# Patient Record
Sex: MALE | Race: WHITE
[De-identification: names, ages, dates, MRNs, and addresses within clinical notes are randomized per-mention and may not be internally consistent; named-entity substitution may affect disease eponyms.]

---

## 2019-11-07 ENCOUNTER — HOSPITAL ENCOUNTER (OUTPATIENT)
Dept: HOSPITAL 56 - MW.ED | Age: 39
LOS: 1 days | Discharge: HOME | End: 2019-11-08
Attending: SURGERY
Payer: COMMERCIAL

## 2019-11-07 DIAGNOSIS — K35.80: Primary | ICD-10-CM

## 2019-11-07 DIAGNOSIS — Z23: ICD-10-CM

## 2019-11-07 LAB
BUN SERPL-MCNC: 24 MG/DL (ref 7–18)
CHLORIDE SERPL-SCNC: 105 MMOL/L (ref 98–107)
CO2 SERPL-SCNC: 24.9 MMOL/L (ref 21–32)
GLUCOSE SERPL-MCNC: 112 MG/DL (ref 74–106)
LIPASE SERPL-CCNC: 75 U/L (ref 73–393)
POTASSIUM SERPL-SCNC: 4.6 MMOL/L (ref 3.5–5.1)
SODIUM SERPL-SCNC: 140 MMOL/L (ref 136–148)

## 2019-11-07 PROCEDURE — 93005 ELECTROCARDIOGRAM TRACING: CPT

## 2019-11-07 PROCEDURE — 74177 CT ABD & PELVIS W/CONTRAST: CPT

## 2019-11-07 PROCEDURE — 44970 LAPAROSCOPY APPENDECTOMY: CPT

## 2019-11-07 PROCEDURE — C9113 INJ PANTOPRAZOLE SODIUM, VIA: HCPCS

## 2019-11-07 PROCEDURE — 90686 IIV4 VACC NO PRSV 0.5 ML IM: CPT

## 2019-11-07 PROCEDURE — 81003 URINALYSIS AUTO W/O SCOPE: CPT

## 2019-11-07 PROCEDURE — 99284 EMERGENCY DEPT VISIT MOD MDM: CPT

## 2019-11-07 PROCEDURE — 82150 ASSAY OF AMYLASE: CPT

## 2019-11-07 PROCEDURE — 36415 COLL VENOUS BLD VENIPUNCTURE: CPT

## 2019-11-07 PROCEDURE — 83605 ASSAY OF LACTIC ACID: CPT

## 2019-11-07 PROCEDURE — 80053 COMPREHEN METABOLIC PANEL: CPT

## 2019-11-07 PROCEDURE — 83690 ASSAY OF LIPASE: CPT

## 2019-11-07 PROCEDURE — 85025 COMPLETE CBC W/AUTO DIFF WBC: CPT

## 2019-11-07 RX ADMIN — CEFOXITIN SODIUM SCH MLS/HR: 1 INJECTION, SOLUTION INTRAVENOUS at 16:46

## 2019-11-07 NOTE — OR
SURGEON:

Clarence Pimentel M.D.

 

DATE OF PROCEDURE:  11/07/2019

 

PROCEDURE PERFORMED:

Laparoscopic appendectomy.

 

PRIMARY SURGEON:

Clarence Pimentel M.D.

 

ASSISTANT:

First assist: Dr. Marlow, PGY-2.

 

ANESTHESIA:

General endotracheal.

 

ASA CLASSIFICATION:

IIE.

 

PREOPERATIVE DIAGNOSIS:

Acute abdomen.

 

POSTOPERATIVE DIAGNOSIS:

Acute appendicitis without perforation.

 

ESTIMATED BLOOD LOSS:

10 mL.

 

INTRAOPERATIVE FLUID REPLACEMENT:

1200 mL of crystalloid.

 

INTRAOPERATIVE URINE OUTPUT:

500 mL.

 

DESCRIPTION OF PROCEDURE:

The patient was taken to the operating room and placed on the operating table in

the supine position.  Time-out was called for appropriate identification of the

patient and procedure.  Thigh-high TEDs and sequential compression boots were

placed.  Following satisfactory attainment of general endotracheal anesthesia, a

Ying catheter was placed in the patient's urinary bladder.  The abdomen was

prepped with DuraPrep solution, sterile drapes were applied.  The skin just

above the umbilicus was infiltrated with 0.5% Marcaine solution.  The skin

incision was made and deepened through the subcutaneous tissue obtaining

hemostasis with the use of electrocautery.  The Veress needle was introduced

into the peritoneal cavity.  Saline drop test was positive.  Carbon dioxide

pneumoperitoneum was established with the release set at 13 cm of water.  Once a

satisfactory pneumoperitoneum was established, 5 mm camera and port were placed

through the supraumbilical incision.  The patient was now positioned with his

head down and rolled to the left.  Under camera vision, 12 mm suprapubic and 5

mm left lower quadrant ports were placed.  Each incision was preemptively

infiltrated with 0.5% Marcaine solution.  The appendix was grasped and was

acutely inflamed but not perforated and not gangrenous.  The mesoappendix was

taken down with the Harmonic scalpel.  The base of the appendix was transected

using the Endo-MARISSA with a blue load staple.  The staple line was inspected and

secured.  The right lower quadrant was irrigated with sterile saline solution.

All fluid was aspirated.  The appendix had been placed in an Endo Catch and

maintained in situ while the right lower quadrant was irrigated.  All fluid was

aspirated.  The patient was returned to a neutral position.  Under camera

vision, the 12 mm suprapubic and Endo Catch containing appendix were removed.

Again, under camera vision, the 5 mm left lower quadrant port was removed, and

finally, the supraumbilical camera port were removed.  Wounds were inspected for

hemostasis and small bleeding sites were electrocoagulated.  The suprapubic and

supraumbilical incisions were closed in 2 layers approximating the subcutaneous

tissue with 3-0 Vicryl and the skin with subcuticular 4-0 Monocryl.  The left

lower quadrant incision was closed with subcuticular 4-0 Monocryl.  All

incisions were Steri-Stripped and dressed with sterile Tegaderm pads.

 

Sponge, needle, and instrument counts were all correct.  Ying catheter was

removed prior to emergence from anesthesia.  Following emergence from anesthesia

and extubation, the patient was taken to recovery room in stable condition.

 

 

CRISTIANO OSORIO

DD:  11/07/2019 14:00:02

DT:  11/07/2019 14:56:07

Job #:  [id number]

## 2019-11-07 NOTE — EDM.PDOC
<Lacey Collins - Last Filed: 11/07/19 06:30>





ED HPI GENERAL MEDICAL PROBLEM





- General


Chief Complaint: Abdominal Pain


Stated Complaint: ABDOMINAL PAIN, VOMITING


Time Seen by Provider: 11/07/19 06:13





- History of Present Illness


INITIAL COMMENTS - FREE TEXT/NARRATIVE: 





HISTORY AND PHYSICAL:





History of present illness:


The patient is a 39-year-old male with no GI or  history and no abdominal 

surgical history who presents with 5-6 hours of epigastric and upper abdominal 

pain associated with vomiting. The patient said he had a normal day yesterday 

without any systemic issues and no new foods were consumed and he went to sleep 

and awoke from sleep with epigastric and upper abdominal bilateral pain which 

did not radiate to his lower abdomen or his flanks and then proceeded to have 

nausea and intractable vomiting. His vomit was not black or bloody nor was it 

ileus. The patient had a normal bowel movement yesterday which was not black 

bloody or diarrhea. He's had no flank pain no urinary symptoms no cough no 

chest pain and no shortness of breath. He said that with the vomiting and the 

pain he has felt feverish but he did not have a documented fever. He does not 

drink an excessive amount of caffeine and does not drink alcohol and denies 

other social history. The patient denies food intolerance in the past 

especially fatty foods or spicy foods. He has not had any exotic travel or new 

foods in his diet in the last 24 hours. He describes the pain as a deep crampy 

and achy pain and it does not radiate to his back. It does not localize to the 

right or left but is just in the upper abdomen and he does feel some slight 

discomfort in the lower abdomen but he feels that it's originating in the upper 

abdomen. He does not take any over-the-counter medications to try to help with 

this pain. The patient says that in the past he has not had issues with 

heartburn nor does he take any significant amount of nonsteroidals or heartburn 

medication.





Review of systems: 


As per history of present illness and below otherwise all systems reviewed and 

negative.





Past medical history: 


As per history of present illness and as reviewed below otherwise 

noncontributory.





Surgical history: 


As per history of present illness and as reviewed below otherwise 

noncontributory.





Social history: 


No reported history of drug or alcohol abuse.





Family history: 


As per history of present illness and as reviewed below otherwise 

noncontributory.





Physical exam:


General: Well-developed well-nourished overweight man who is nontoxic and vital 

signs are noted by me. He ambulated into the ED without assistance


HEENT: Atraumatic, normocephalic,  negative for conjunctival pallor or scleral 

icterus, mucous membranes moist, throat clear, neck supple, nontender, trachea 

midline.


Lungs: Clear to auscultation, breath sounds equal bilaterally, chest nontender.


Heart: S1S2, regular rate and rhythm no overt murmurs


Abdomen: Soft, nondistended, no tympany on percussion and bowel sounds are 

mildly hypoactive. On palpation there is some diffuse upper abdominal 

tenderness more in the epigastrium and not as much in the left upper and right 

upper quadrants. There is specific tenderness in the right lower quadrant with 

some involuntary guarding but no rebound which surprised the patient on my 

exam. Negative for masses or hepatosplenomegaly. Negative for costovertebral 

tenderness.


Pelvis: Stable nontender.


Genitourinary: Deferred.


Rectal: Deferred.


Extremities: Atraumatic, negative for cords or calf pain. Neurovascular 

unremarkable.


Neuro: Awake, alert, oriented. Cranial nerves II through XII unremarkable. 

Cerebellum unremarkable. Motor and sensory unremarkable throughout. Exam 

nonfocal.





Diagnostics:


CBC CMP amylase lipase UA with reflex lactic acid CT scan of the abdomen and 

pelvis EKG





Therapeutics:


IV fluids Zofran and Protonix morphine





0650: Case is endorsed to Dr. Betts to follow-up lab tests and CAT scan and 

disposition patient pending these results.





Impression: 


Upper abdominal/epigastric pain with vomiting





Definitive disposition and diagnosis as appropriate pending reevaluation and 

review of above.


  ** epigastric


Pain Score (Numeric/FACES): 10





- Related Data


 Allergies











Allergy/AdvReac Type Severity Reaction Status Date / Time


 


No Known Allergies Allergy   Verified 11/07/19 06:08











Home Meds: 


 Home Meds





. [No Known Home Meds]  05/16/14 [History]











Past Medical History





- Past Health History


Medical/Surgical History: Denies Medical/Surgical History





Social & Family History





- Tobacco Use


Smoking Status *Q: Never Smoker





- Recreational Drug Use


Recreational Drug Use: No





ED ROS GENERAL





- Review of Systems


Review Of Systems: ROS reveals no pertinent complaints other than HPI.





ED EXAM, GENERAL





- Physical Exam


Exam: See Below (See dictation)





Course





- Vital Signs


Last Recorded V/S: 





 Last Vital Signs











Temp  97.3 F   11/07/19 08:23


 


Pulse  76   11/07/19 08:56


 


Resp  15   11/07/19 08:56


 


BP  131/61   11/07/19 08:56


 


Pulse Ox  98   11/07/19 08:56














- Orders/Labs/Meds


Orders: 





 Active Orders 24 hr











 Category Date Time Status


 


 EKG Documentation Completion [RC] STAT Care  11/07/19 06:19 Active


 


 Piperacillin/Tazobactam [Piperacil-Tazobact] 3.375 gm Med  11/07/19 08:40 

Active





 Sodium Chloride 0.9% [Normal Saline] 50 ml   





 IV ONETIME   


 


 Sodium Chloride 0.9% [Saline Flush] Med  11/07/19 06:20 Active





 10 ml FLUSH ASDIRECTED PRN   


 


 Sodium Chloride 0.9% [Saline Flush] Med  11/07/19 06:20 Active





 2.5 ml FLUSH ASDIRECTED PRN   


 


 Saline Lock Insert [OM.PC] Stat Oth  11/07/19 06:19 Ordered








 Medication Orders





Piperacillin Sod/Tazobactam (Sod 3.375 gm/ Sodium Chloride)  50 mls @ 100 mls/

hr IV ONETIME ONE


   Stop: 11/07/19 09:09


   Last Admin: 11/07/19 08:53  Dose: 100 mls/hr


Sodium Chloride (Saline Flush)  10 ml FLUSH ASDIRECTED PRN


   PRN Reason: Keep Vein Open


Sodium Chloride (Saline Flush)  2.5 ml FLUSH ASDIRECTED PRN


   PRN Reason: Keep Vein Open








Labs: 





 Laboratory Tests











  11/07/19 11/07/19 11/07/19 Range/Units





  06:30 06:30 06:30 


 


WBC  11.66 H    (4.0-11.0)  K/uL


 


RBC  5.19    (4.50-5.90)  M/uL


 


Hgb  15.0    (13.0-17.0)  g/dL


 


Hct  44.4    (38.0-50.0)  %


 


MCV  85.5    (80.0-98.0)  fL


 


MCH  28.9    (27.0-32.0)  pg


 


MCHC  33.8    (31.0-37.0)  g/dL


 


RDW Std Deviation  40.3    (28.0-62.0)  fl


 


RDW Coeff of Caryl  13    (11.0-15.0)  %


 


Plt Count  236    (150-400)  K/uL


 


MPV  9.70    (7.40-12.00)  fL


 


Neut % (Auto)  82.1 H    (48.0-80.0)  %


 


Lymph % (Auto)  11.2 L    (16.0-40.0)  %


 


Mono % (Auto)  5.6    (0.0-15.0)  %


 


Eos % (Auto)  0.8    (0.0-7.0)  %


 


Baso % (Auto)  0.3    (0.0-1.5)  %


 


Neut # (Auto)  9.6 H    (1.4-5.7)  K/uL


 


Lymph # (Auto)  1.3    (0.6-2.4)  K/uL


 


Mono # (Auto)  0.7    (0.0-0.8)  K/uL


 


Eos # (Auto)  0.1    (0.0-0.7)  K/uL


 


Baso # (Auto)  0.0    (0.0-0.1)  K/uL


 


Nucleated RBC %  0.0    /100WBC


 


Nucleated RBCs #  0    K/uL


 


Lactate   1.1   (0.20-2.00)  mmol/L


 


Sodium    140  (136-148)  mmol/L


 


Potassium    4.6  (3.5-5.1)  mmol/L


 


Chloride    105  ()  mmol/L


 


Carbon Dioxide    24.9  (21.0-32.0)  mmol/L


 


BUN    24 H  (7.0-18.0)  mg/dL


 


Creatinine    1.4 H  (0.8-1.3)  mg/dL


 


Est Cr Clr Drug Dosing    85.51  mL/min


 


Estimated GFR (MDRD)    56.7  ml/min


 


Glucose    112 H  ()  mg/dL


 


Calcium    8.7  (8.5-10.1)  mg/dL


 


Total Bilirubin    0.5  (0.2-1.0)  mg/dL


 


AST    28  (15-37)  IU/L


 


ALT    52  (14-63)  IU/L


 


Alkaline Phosphatase    66  ()  U/L


 


Total Protein    7.3  (6.4-8.2)  g/dL


 


Albumin    4.1  (3.4-5.0)  g/dL


 


Globulin    3.2  (2.6-4.0)  g/dL


 


Albumin/Globulin Ratio    1.3  (0.9-1.6)  


 


Amylase    48  ()  U/L


 


Lipase    75  ()  U/L


 


Urine Color     


 


Urine Appearance     


 


Urine pH     (5.0-8.0)  


 


Ur Specific Gravity     (1.001-1.035)  


 


Urine Protein     (NEGATIVE)  mg/dL


 


Urine Glucose (UA)     (NEGATIVE)  mg/dL


 


Urine Ketones     (NEGATIVE)  mg/dL


 


Urine Occult Blood     (NEGATIVE)  


 


Urine Nitrite     (NEGATIVE)  


 


Urine Bilirubin     (NEGATIVE)  


 


Urine Urobilinogen     (<2.0)  EU/dL


 


Ur Leukocyte Esterase     (NEGATIVE)  














  11/07/19 Range/Units





  06:50 


 


WBC   (4.0-11.0)  K/uL


 


RBC   (4.50-5.90)  M/uL


 


Hgb   (13.0-17.0)  g/dL


 


Hct   (38.0-50.0)  %


 


MCV   (80.0-98.0)  fL


 


MCH   (27.0-32.0)  pg


 


MCHC   (31.0-37.0)  g/dL


 


RDW Std Deviation   (28.0-62.0)  fl


 


RDW Coeff of Caryl   (11.0-15.0)  %


 


Plt Count   (150-400)  K/uL


 


MPV   (7.40-12.00)  fL


 


Neut % (Auto)   (48.0-80.0)  %


 


Lymph % (Auto)   (16.0-40.0)  %


 


Mono % (Auto)   (0.0-15.0)  %


 


Eos % (Auto)   (0.0-7.0)  %


 


Baso % (Auto)   (0.0-1.5)  %


 


Neut # (Auto)   (1.4-5.7)  K/uL


 


Lymph # (Auto)   (0.6-2.4)  K/uL


 


Mono # (Auto)   (0.0-0.8)  K/uL


 


Eos # (Auto)   (0.0-0.7)  K/uL


 


Baso # (Auto)   (0.0-0.1)  K/uL


 


Nucleated RBC %   /100WBC


 


Nucleated RBCs #   K/uL


 


Lactate   (0.20-2.00)  mmol/L


 


Sodium   (136-148)  mmol/L


 


Potassium   (3.5-5.1)  mmol/L


 


Chloride   ()  mmol/L


 


Carbon Dioxide   (21.0-32.0)  mmol/L


 


BUN   (7.0-18.0)  mg/dL


 


Creatinine   (0.8-1.3)  mg/dL


 


Est Cr Clr Drug Dosing   mL/min


 


Estimated GFR (MDRD)   ml/min


 


Glucose   ()  mg/dL


 


Calcium   (8.5-10.1)  mg/dL


 


Total Bilirubin   (0.2-1.0)  mg/dL


 


AST   (15-37)  IU/L


 


ALT   (14-63)  IU/L


 


Alkaline Phosphatase   ()  U/L


 


Total Protein   (6.4-8.2)  g/dL


 


Albumin   (3.4-5.0)  g/dL


 


Globulin   (2.6-4.0)  g/dL


 


Albumin/Globulin Ratio   (0.9-1.6)  


 


Amylase   ()  U/L


 


Lipase   ()  U/L


 


Urine Color  YELLOW  


 


Urine Appearance  CLEAR  


 


Urine pH  6.0  (5.0-8.0)  


 


Ur Specific Gravity  1.020  (1.001-1.035)  


 


Urine Protein  NEGATIVE  (NEGATIVE)  mg/dL


 


Urine Glucose (UA)  NEGATIVE  (NEGATIVE)  mg/dL


 


Urine Ketones  NEGATIVE  (NEGATIVE)  mg/dL


 


Urine Occult Blood  NEGATIVE  (NEGATIVE)  


 


Urine Nitrite  NEGATIVE  (NEGATIVE)  


 


Urine Bilirubin  NEGATIVE  (NEGATIVE)  


 


Urine Urobilinogen  0.2  (<2.0)  EU/dL


 


Ur Leukocyte Esterase  NEGATIVE  (NEGATIVE)  











Meds: 





Medications











Generic Name Dose Route Start Last Admin





  Trade Name Freq  PRN Reason Stop Dose Admin


 


Piperacillin Sod/Tazobactam  50 mls @ 100 mls/hr  11/07/19 08:40  11/07/19 08:53





  Sod 3.375 gm/ Sodium Chloride  IV  11/07/19 09:09  100 mls/hr





  ONETIME ONE   Administration





     





     





     





     


 


Sodium Chloride  10 ml  11/07/19 06:20  





  Saline Flush  FLUSH   





  ASDIRECTED PRN   





  Keep Vein Open   





     





     





     


 


Sodium Chloride  2.5 ml  11/07/19 06:20  





  Saline Flush  FLUSH   





  ASDIRECTED PRN   





  Keep Vein Open   





     





     





     














Discontinued Medications














Generic Name Dose Route Start Last Admin





  Trade Name Freq  PRN Reason Stop Dose Admin


 


Hydromorphone HCl  0.5 mg  11/07/19 07:55  11/07/19 08:02





  Dilaudid  IVPUSH  11/07/19 07:56  0.5 mg





  ONETIME ONE   Administration





     





     





     





     


 


Sodium Chloride  1,000 mls @ 999 mls/hr  11/07/19 06:20  11/07/19 06:22





  Normal Saline  IV  11/07/19 07:20  999 mls/hr





  STAT ONE   Administration





     





     





     





     


 


Sodium Chloride  Confirm  11/07/19 06:28  11/07/19 06:39





  Normal Saline  Administered  11/07/19 06:29  20 mls/hr





  Dose   Administration





  20 mls @ as directed   





  .ROUTE   





  .STK-MED ONE   





     





     





     





     


 


Sodium Chloride  1,000 mls @ 999 mls/hr  11/07/19 07:02  11/07/19 07:46





  Normal Saline  IV  11/07/19 08:02  999 mls/hr





  .Bolus ONE   Administration





     





     





     





     


 


Iopamidol  90 ml  11/07/19 07:20  11/07/19 07:21





  Isovue-370 (76%)  IVPUSH  11/07/19 07:21  90 ml





  ONETIME STA   Administration





     





     





     





     


 


Morphine Sulfate  4 mg  11/07/19 06:20  11/07/19 06:40





  Morphine  IVPUSH  11/07/19 06:21  4 mg





  ONETIME ONE   Administration





     





     





     





     


 


Ondansetron HCl  4 mg  11/07/19 06:20  11/07/19 06:36





  Zofran  IVPUSH  11/07/19 06:21  4 mg





  ONETIME ONE   Administration





     





     





     





     


 


Pantoprazole Sodium  80 mg  11/07/19 06:20  11/07/19 06:44





  Protonix Iv***  IVPUSH  11/07/19 06:21  80 mg





  .BOLUS ONE   Administration





     





     





     





     














Departure





- Departure


Disposition: Still A Patient 30


Condition: Good


Clinical Impression: 


 Abdominal pain with vomiting





Appendicitis


Qualifiers:


 Appendicitis type: acute appendicitis Acute appendicitis type: other Qualified 

Code(s): K35.890 - Other acute appendicitis without perforation or gangrene; 

K35.89 - Other acute appendicitis








- Discharge Information





- My Orders


Last 24 Hours: 





My Active Orders





11/07/19 08:40


Piperacillin/Tazobactam [Piperacil-Tazobact] 3.375 gm   Sodium Chloride 0.9% [

Normal Saline] 50 ml IV ONETIME 














- Assessment/Plan


Last 24 Hours: 





My Active Orders





11/07/19 08:40


Piperacillin/Tazobactam [Piperacil-Tazobact] 3.375 gm   Sodium Chloride 0.9% [

Normal Saline] 50 ml IV ONETIME 














<Cande Betts - Last Filed: 11/07/19 09:03>





ED HPI GENERAL MEDICAL PROBLEM





- History of Present Illness


INITIAL COMMENTS - FREE TEXT/NARRATIVE: 


patient was signed out to me byn Dr. Collins to follow up on work up in 

progress. Pt has acute uncomplicated appendicitis on CT with a WBC of 11. Zosyn 

given and Dr. Pimentel consulted. Pt will go directly to the OR.








Departure





- Departure


Time of Disposition: 09:02

## 2019-11-07 NOTE — PCM.POSTAN
POST ANESTHESIA ASSESSMENT





- MENTAL STATUS


Mental Status: Alert, Oriented





- VITAL SIGNS


Vital Signs: 


 Last Vital Signs











Temp  101.8 F H  11/07/19 14:01


 


Pulse  96   11/07/19 14:36


 


Resp  16   11/07/19 14:36


 


BP  109/68   11/07/19 14:36


 


Pulse Ox  95   11/07/19 14:36














- RESPIRATORY


Respiratory Status: Respiratory Rate WNL, Airway Patent, O2 Saturation Stable





- CARDIOVASCULAR


CV Status: Pulse Rate WNL, Blood Pressure Stable





- GASTROINTESTINAL


GI Status: No Symptoms





- POST OP HYDRATION


Hydration Status: Adequate & Stable

## 2019-11-07 NOTE — CT
INDICATION:



Epigastric and right lower quadrant abdominal pain for 5 hours with 

vomiting.



COMPARISON:



None.



TECHNIQUE:



CT of the abdomen pelvis with IV contrast. 90 cc of Isovue-370 was 

administered.



FINDINGS:



Mild basilar atelectasis. Liver, gallbladder, spleen, pancreas, adrenal 

glands and kidneys are unremarkable. The abdominal aorta is normal in 

caliber. No lymphadenopathy in the abdomen or pelvis. Urinary bladder is 

nondistended. Prostate is unremarkable. No free fluid or free air. Mild 

diverticulosis without evidence of acute diverticulitis. The appendix is 

dilated measuring up to 14 mm, with an appendicolith (series 203 image 54) 

and mild adjacent stranding consistent with acute appendicitis. No abscess 

no evidence of abscess or perforation. Bones are unremarkable for age.



IMPRESSION:



Acute uncomplicated appendicitis.



Please note that all CT scans at this facility use dose modulation, 

iterative reconstruction, and/or weight-based dosing when appropriate to 

reduce radiation dose to as low as reasonably achievable.



Dictated by Nicholas Vick MD @ Nov 7 2019  8:15AM



Signed by Dr. Nicholas Vick @ Nov 7 2019  8:20AM

## 2019-11-07 NOTE — PCM.OPNOTE
- General Post-Op/Procedure Note


Date of Surgery/Procedure: 11/07/19


Operative Procedure(s): Laparoscopic appendectomy


Pre Op Diagnosis: Acute abdomen


Post-Op Diagnosis: Acute appendicitis without perforation


Anesthesia Technique: General ET Tube (ASA IIE)


Primary Surgeon: Clarence Pimentel


Assistant: Liya Marlow


Fluid Replacement, Intraop: 1,200


Output, Urine Amount: 500


EBL in mLs: 10


Condition: Good


Free Text/Narrative:: 


 Intake & Output











 11/07/19 11/07/19 11/07/19





 03:59 11:59 19:59


 


Output Total   500


 


Balance   -500








DICTATION [id number]





CPT CODE 48184

## 2019-11-07 NOTE — PCM.PREANE
Preanesthetic Assessment





- Anesthesia/Transfusion/Family Hx


Anesthesia History: No Prior Anesthesia


Family History of Anesthesia Reaction: No


Transfusion History: No Prior Transfusion(s)


Type of Transfusion Reactions: Reports: Unknown





- Review of Systems


General: No Symptoms


Cardiovascular: No Symptoms


Gastrointestinal: Abdominal Pain, Nausea, Vomiting


Neurological: No Symptoms


Other: Reports: None





- Physical Assessment


NPO Status Date: 11/07/19


NPO Status Time: 00:05


Vital Signs: 





 Last Vital Signs











Temp  36.3 C   11/07/19 08:23


 


Pulse  76   11/07/19 08:56


 


Resp  15   11/07/19 08:56


 


BP  131/61   11/07/19 08:56


 


Pulse Ox  98   11/07/19 08:56











Height: 1.91 m


Weight: 129.274 kg


ASA Class: 1E


Mental Status: Alert & Oriented x3


Dentition: Reports: Normal Dentition


ROM/Head Extension: Full


Lungs: Clear to Auscultation


Cardiovascular: Regular Rate





- Lab


Values: 





 Laboratory Last Values











WBC  11.66 K/uL (4.0-11.0)  H  11/07/19  06:30    


 


RBC  5.19 M/uL (4.50-5.90)   11/07/19  06:30    


 


Hgb  15.0 g/dL (13.0-17.0)   11/07/19  06:30    


 


Hct  44.4 % (38.0-50.0)   11/07/19  06:30    


 


MCV  85.5 fL (80.0-98.0)   11/07/19  06:30    


 


MCH  28.9 pg (27.0-32.0)   11/07/19  06:30    


 


MCHC  33.8 g/dL (31.0-37.0)   11/07/19  06:30    


 


RDW Std Deviation  40.3 fl (28.0-62.0)   11/07/19  06:30    


 


RDW Coeff of Caryl  13 % (11.0-15.0)   11/07/19  06:30    


 


Plt Count  236 K/uL (150-400)   11/07/19  06:30    


 


MPV  9.70 fL (7.40-12.00)   11/07/19  06:30    


 


Neut % (Auto)  82.1 % (48.0-80.0)  H  11/07/19  06:30    


 


Lymph % (Auto)  11.2 % (16.0-40.0)  L  11/07/19  06:30    


 


Mono % (Auto)  5.6 % (0.0-15.0)   11/07/19  06:30    


 


Eos % (Auto)  0.8 % (0.0-7.0)   11/07/19  06:30    


 


Baso % (Auto)  0.3 % (0.0-1.5)   11/07/19  06:30    


 


Neut # (Auto)  9.6 K/uL (1.4-5.7)  H  11/07/19  06:30    


 


Lymph # (Auto)  1.3 K/uL (0.6-2.4)   11/07/19  06:30    


 


Mono # (Auto)  0.7 K/uL (0.0-0.8)   11/07/19  06:30    


 


Eos # (Auto)  0.1 K/uL (0.0-0.7)   11/07/19  06:30    


 


Baso # (Auto)  0.0 K/uL (0.0-0.1)   11/07/19  06:30    


 


Nucleated RBC %  0.0 /100WBC  11/07/19  06:30    


 


Nucleated RBCs #  0 K/uL  11/07/19  06:30    


 


Lactate  1.1 mmol/L (0.20-2.00)   11/07/19  06:30    


 


Sodium  140 mmol/L (136-148)   11/07/19  06:30    


 


Potassium  4.6 mmol/L (3.5-5.1)   11/07/19  06:30    


 


Chloride  105 mmol/L ()   11/07/19  06:30    


 


Carbon Dioxide  24.9 mmol/L (21.0-32.0)   11/07/19  06:30    


 


BUN  24 mg/dL (7.0-18.0)  H  11/07/19  06:30    


 


Creatinine  1.4 mg/dL (0.8-1.3)  H  11/07/19  06:30    


 


Est Cr Clr Drug Dosing  85.51 mL/min  11/07/19  06:30    


 


Estimated GFR (MDRD)  56.7 ml/min  11/07/19  06:30    


 


Glucose  112 mg/dL ()  H  11/07/19  06:30    


 


Calcium  8.7 mg/dL (8.5-10.1)   11/07/19  06:30    


 


Total Bilirubin  0.5 mg/dL (0.2-1.0)   11/07/19  06:30    


 


AST  28 IU/L (15-37)   11/07/19  06:30    


 


ALT  52 IU/L (14-63)   11/07/19  06:30    


 


Alkaline Phosphatase  66 U/L ()   11/07/19  06:30    


 


Total Protein  7.3 g/dL (6.4-8.2)   11/07/19  06:30    


 


Albumin  4.1 g/dL (3.4-5.0)   11/07/19  06:30    


 


Globulin  3.2 g/dL (2.6-4.0)   11/07/19  06:30    


 


Albumin/Globulin Ratio  1.3  (0.9-1.6)   11/07/19  06:30    


 


Amylase  48 U/L ()   11/07/19  06:30    


 


Lipase  75 U/L ()   11/07/19  06:30    


 


Urine Color  YELLOW   11/07/19  06:50    


 


Urine Appearance  CLEAR   11/07/19  06:50    


 


Urine pH  6.0  (5.0-8.0)   11/07/19  06:50    


 


Ur Specific Gravity  1.020  (1.001-1.035)   11/07/19  06:50    


 


Urine Protein  NEGATIVE mg/dL (NEGATIVE)   11/07/19  06:50    


 


Urine Glucose (UA)  NEGATIVE mg/dL (NEGATIVE)   11/07/19  06:50    


 


Urine Ketones  NEGATIVE mg/dL (NEGATIVE)   11/07/19  06:50    


 


Urine Occult Blood  NEGATIVE  (NEGATIVE)   11/07/19  06:50    


 


Urine Nitrite  NEGATIVE  (NEGATIVE)   11/07/19  06:50    


 


Urine Bilirubin  NEGATIVE  (NEGATIVE)   11/07/19  06:50    


 


Urine Urobilinogen  0.2 EU/dL (<2.0)   11/07/19  06:50    


 


Ur Leukocyte Esterase  NEGATIVE  (NEGATIVE)   11/07/19  06:50    














- Allergies


Allergies/Adverse Reactions: 


 Allergies











Allergy/AdvReac Type Severity Reaction Status Date / Time


 


No Known Allergies Allergy   Verified 11/07/19 06:08














- Blood


Blood Available: No





- Acknowledgements


Anesthesia Type Planned: General Anesthesia


Pt an Appropriate Candidate for the Planned Anesthesia: Yes


Alternatives and Risks of Anesthesia Discussed w Pt/Guardian: Yes


Pt/Guardian Understands and Agrees with Anesthesia Plan: Yes





PreAnesthesia Questionnaire





- Past Health History


Medical/Surgical History: Denies Medical/Surgical History


Respiratory History: Reports: None





- Past Surgical History


Head Surgeries/Procedures: Reports: None


HEENT Surgical History: Reports: None


Cardiovascular Surgical History: Reports: None


Respiratory Surgical History: Reports: None


GI Surgical History: Reports: None


Male  Surgical History: Reports: None


Endocrine Surgical History: Reports: None


Neurological Surgical History: Reports: None


Musculoskeletal Surgical History: Reports: None


Oncologic Surgical History: Reports: None


Dermatological Surgical History: Reports: None





- SUBSTANCE USE


Smoking Status *Q: Never Smoker


Tobacco Use Within Last Twelve Months: No


Second Hand Smoke Exposure: No


Days Per Week of Alcohol Use: 1


Number of Drinks Per Day: 0


Total Drinks Per Week: 0


Recreational Drug Use History: No





- HOME MEDS


Home Medications: 


 Home Meds





. [No Known Home Meds]  05/16/14 [History]











- CURRENT (IN HOUSE) MEDS


Current Meds: 





 Current Medications





Hydromorphone HCl (Dilaudid)  1 mg IVPUSH ONETIME ONE


   Stop: 11/07/19 10:34


Lactated Ringer's (Ringers, Lactated)  1,000 mls @ 125 mls/hr IV ASDIRECTED PEPPER


Influenza Virus Vaccine (Fluzone Quad [id number] Syringe)  60 mcg IM .ONCE ONE


   Stop: 11/09/19 10:31


Morphine Sulfate (Morphine)  0 mg IVPUSH Q1H PRN


   PRN Reason: Pain (moderate 4-6)


Sodium Chloride (Saline Flush)  10 ml FLUSH ASDIRECTED PRN


   PRN Reason: Keep Vein Open


Sodium Chloride (Saline Flush)  2.5 ml FLUSH ASDIRECTED PRN


   PRN Reason: Keep Vein Open





Discontinued Medications





Hydromorphone HCl (Dilaudid)  0.5 mg IVPUSH ONETIME ONE


   Stop: 11/07/19 07:56


   Last Admin: 11/07/19 08:02 Dose:  0.5 mg


Sodium Chloride (Normal Saline)  1,000 mls @ 999 mls/hr IV STAT ONE


   Stop: 11/07/19 07:20


   Last Admin: 11/07/19 06:22 Dose:  999 mls/hr


Sodium Chloride (Normal Saline) Confirm Administered Dose 20 mls @ as directed 

.ROUTE .STK-MED ONE


   Stop: 11/07/19 06:29


   Last Admin: 11/07/19 06:39 Dose:  20 mls/hr


Sodium Chloride (Normal Saline)  1,000 mls @ 999 mls/hr IV .Bolus ONE


   Stop: 11/07/19 08:02


   Last Admin: 11/07/19 07:46 Dose:  999 mls/hr


Piperacillin Sod/Tazobactam (Sod 3.375 gm/ Sodium Chloride)  50 mls @ 100 mls/

hr IV ONETIME ONE


   Stop: 11/07/19 09:09


   Last Admin: 11/07/19 08:53 Dose:  100 mls/hr


Influenza Virus Vaccine (Pharmacy To Dose - Influenza Vaccine)  1 each IM 

ONETIME ONE


   Stop: 11/07/19 10:10


Iopamidol (Isovue-370 (76%))  90 ml IVPUSH ONETIME STA


   Stop: 11/07/19 07:21


   Last Admin: 11/07/19 07:21 Dose:  90 ml


Morphine Sulfate (Morphine)  4 mg IVPUSH ONETIME ONE


   Stop: 11/07/19 06:21


   Last Admin: 11/07/19 06:40 Dose:  4 mg


Ondansetron HCl (Zofran)  4 mg IVPUSH ONETIME ONE


   Stop: 11/07/19 06:21


   Last Admin: 11/07/19 06:36 Dose:  4 mg


Pantoprazole Sodium (Protonix Iv***)  80 mg IVPUSH .BOLUS ONE


   Stop: 11/07/19 06:21


   Last Admin: 11/07/19 06:44 Dose:  80 mg

## 2019-11-07 NOTE — PCM.CONS
<Kashmir,Horaciojatin - Last Filed: 11/07/19 09:13>





H&P History of Present Illness





- General


Date of Service: 11/07/19


Admit Problem/Dx: 





Acute appendicitis


Source of Information: Patient


History Limitations: Reports: No Limitations





- History of Present Illness


Initial Comments - Free Text/Narative: 





Mr. Sahni is a 39 yr old male that presented to the ED this am with right 

lower quadrant abdominal pain and nausea. He states that the pain started out 

of the blue in the middle of the night. He had nausea and vomited one time. He 

states that the pain progressively got worse and he was unable to sleep or sit 

still. He came into the ED this am and said the car ride was miserable. He has 

never had anything like this before. He endorses associated fevers. Denies 

chills and diarrhea. 


Onset of Symptoms: Reports: Today, Sudden


Symptom Onset Date: 11/07/19


Symptom Onset Time: 00:00


Duration of Symptoms: Reports: Hour(s):, Getting Worse


Location: Reports: Abdomen


Severity: Moderate


Improves with: Reports: None


Worsens with: Reports: Movement


Associated Symptoms: Reports: Fever/Chills, Nausea/Vomiting


  ** epigastric


Pain Score (Numeric/FACES): 10





- Related Data


Allergies/Adverse Reactions: 


 Allergies











Allergy/AdvReac Type Severity Reaction Status Date / Time


 


No Known Allergies Allergy   Verified 11/07/19 06:08











Home Medications: 


 Home Meds





. [No Known Home Meds]  05/16/14 [History]











Past Medical History





- Past Health History


Medical/Surgical History: Denies Medical/Surgical History


Respiratory History: Reports: None





- Past Surgical History


Head Surgeries/Procedures: Reports: None


HEENT Surgical History: Reports: None


Cardiovascular Surgical History: Reports: None


Respiratory Surgical History: Reports: None


GI Surgical History: Reports: None


Female  Surgical History: Reports: None


Male  Surgical History: Reports: None


Endocrine Surgical History: Reports: None


Neurological Surgical History: Reports: None


Musculoskeletal Surgical History: Reports: None


Oncologic Surgical History: Reports: None


Dermatological Surgical History: Reports: None





Social & Family History





- Family History


Family Medical History: Noncontributory





- Tobacco Use


Smoking Status *Q: Never Smoker





- Alcohol Use


Alcohol Use History: Yes


Alcohol Use Frequency: Socially





- Recreational Drug Use


Recreational Drug Use: No





H&P Review of Systems





- Review of Systems:


Review Of Systems: See Below


General: Reports: Fever, Decreased Appetite


HEENT: Reports: No Symptoms


Pulmonary: Reports: No Symptoms


Cardiovascular: Reports: No Symptoms


Gastrointestinal: Reports: Abdominal Pain, Nausea, Vomiting


Genitourinary: Reports: No Symptoms


Musculoskeletal: Reports: No Symptoms


Skin: Reports: No Symptoms


Neurological: Reports: No Symptoms


Hematologic/Lymphatic: Reports: No Symptoms


Immunologic: Reports: No Symptoms





Exam





- Exam


Exam: See Below





- Vital Signs


Vital Signs: 


 Last Vital Signs











Temp  97.3 F   11/07/19 08:23


 


Pulse  76   11/07/19 08:56


 


Resp  15   11/07/19 08:56


 


BP  131/61   11/07/19 08:56


 


Pulse Ox  98   11/07/19 08:56











Weight: 285 lb 4.45 oz





- Exam


General: Alert, Oriented, Mild Distress (moving around in bed trying to get 

comfortable)


HEENT: Conjunctiva Clear, EOMI, Hearing Intact


Lungs: Clear to Auscultation, Normal Respiratory Effort


Cardiovascular: Regular Rate, Regular Rhythm


GI/Abdominal Exam: Soft, No Distention, Rebound, Tender (to Right lower quadrant

, positive Rosvigs sign)


Extremities: No Pedal Edema


Skin: Warm, Dry, Intact





- Patient Data


Lab Results Last 24 hrs: 


 Laboratory Results - last 24 hr











  11/07/19 11/07/19 11/07/19 Range/Units





  06:30 06:30 06:30 


 


WBC  11.66 H    (4.0-11.0)  K/uL


 


RBC  5.19    (4.50-5.90)  M/uL


 


Hgb  15.0    (13.0-17.0)  g/dL


 


Hct  44.4    (38.0-50.0)  %


 


MCV  85.5    (80.0-98.0)  fL


 


MCH  28.9    (27.0-32.0)  pg


 


MCHC  33.8    (31.0-37.0)  g/dL


 


RDW Std Deviation  40.3    (28.0-62.0)  fl


 


RDW Coeff of Caryl  13    (11.0-15.0)  %


 


Plt Count  236    (150-400)  K/uL


 


MPV  9.70    (7.40-12.00)  fL


 


Neut % (Auto)  82.1 H    (48.0-80.0)  %


 


Lymph % (Auto)  11.2 L    (16.0-40.0)  %


 


Mono % (Auto)  5.6    (0.0-15.0)  %


 


Eos % (Auto)  0.8    (0.0-7.0)  %


 


Baso % (Auto)  0.3    (0.0-1.5)  %


 


Neut # (Auto)  9.6 H    (1.4-5.7)  K/uL


 


Lymph # (Auto)  1.3    (0.6-2.4)  K/uL


 


Mono # (Auto)  0.7    (0.0-0.8)  K/uL


 


Eos # (Auto)  0.1    (0.0-0.7)  K/uL


 


Baso # (Auto)  0.0    (0.0-0.1)  K/uL


 


Nucleated RBC %  0.0    /100WBC


 


Nucleated RBCs #  0    K/uL


 


Lactate   1.1   (0.20-2.00)  mmol/L


 


Sodium    140  (136-148)  mmol/L


 


Potassium    4.6  (3.5-5.1)  mmol/L


 


Chloride    105  ()  mmol/L


 


Carbon Dioxide    24.9  (21.0-32.0)  mmol/L


 


BUN    24 H  (7.0-18.0)  mg/dL


 


Creatinine    1.4 H  (0.8-1.3)  mg/dL


 


Est Cr Clr Drug Dosing    85.51  mL/min


 


Estimated GFR (MDRD)    56.7  ml/min


 


Glucose    112 H  ()  mg/dL


 


Calcium    8.7  (8.5-10.1)  mg/dL


 


Total Bilirubin    0.5  (0.2-1.0)  mg/dL


 


AST    28  (15-37)  IU/L


 


ALT    52  (14-63)  IU/L


 


Alkaline Phosphatase    66  ()  U/L


 


Total Protein    7.3  (6.4-8.2)  g/dL


 


Albumin    4.1  (3.4-5.0)  g/dL


 


Globulin    3.2  (2.6-4.0)  g/dL


 


Albumin/Globulin Ratio    1.3  (0.9-1.6)  


 


Amylase    48  ()  U/L


 


Lipase    75  ()  U/L


 


Urine Color     


 


Urine Appearance     


 


Urine pH     (5.0-8.0)  


 


Ur Specific Gravity     (1.001-1.035)  


 


Urine Protein     (NEGATIVE)  mg/dL


 


Urine Glucose (UA)     (NEGATIVE)  mg/dL


 


Urine Ketones     (NEGATIVE)  mg/dL


 


Urine Occult Blood     (NEGATIVE)  


 


Urine Nitrite     (NEGATIVE)  


 


Urine Bilirubin     (NEGATIVE)  


 


Urine Urobilinogen     (<2.0)  EU/dL


 


Ur Leukocyte Esterase     (NEGATIVE)  














  11/07/19 Range/Units





  06:50 


 


WBC   (4.0-11.0)  K/uL


 


RBC   (4.50-5.90)  M/uL


 


Hgb   (13.0-17.0)  g/dL


 


Hct   (38.0-50.0)  %


 


MCV   (80.0-98.0)  fL


 


MCH   (27.0-32.0)  pg


 


MCHC   (31.0-37.0)  g/dL


 


RDW Std Deviation   (28.0-62.0)  fl


 


RDW Coeff of Caryl   (11.0-15.0)  %


 


Plt Count   (150-400)  K/uL


 


MPV   (7.40-12.00)  fL


 


Neut % (Auto)   (48.0-80.0)  %


 


Lymph % (Auto)   (16.0-40.0)  %


 


Mono % (Auto)   (0.0-15.0)  %


 


Eos % (Auto)   (0.0-7.0)  %


 


Baso % (Auto)   (0.0-1.5)  %


 


Neut # (Auto)   (1.4-5.7)  K/uL


 


Lymph # (Auto)   (0.6-2.4)  K/uL


 


Mono # (Auto)   (0.0-0.8)  K/uL


 


Eos # (Auto)   (0.0-0.7)  K/uL


 


Baso # (Auto)   (0.0-0.1)  K/uL


 


Nucleated RBC %   /100WBC


 


Nucleated RBCs #   K/uL


 


Lactate   (0.20-2.00)  mmol/L


 


Sodium   (136-148)  mmol/L


 


Potassium   (3.5-5.1)  mmol/L


 


Chloride   ()  mmol/L


 


Carbon Dioxide   (21.0-32.0)  mmol/L


 


BUN   (7.0-18.0)  mg/dL


 


Creatinine   (0.8-1.3)  mg/dL


 


Est Cr Clr Drug Dosing   mL/min


 


Estimated GFR (MDRD)   ml/min


 


Glucose   ()  mg/dL


 


Calcium   (8.5-10.1)  mg/dL


 


Total Bilirubin   (0.2-1.0)  mg/dL


 


AST   (15-37)  IU/L


 


ALT   (14-63)  IU/L


 


Alkaline Phosphatase   ()  U/L


 


Total Protein   (6.4-8.2)  g/dL


 


Albumin   (3.4-5.0)  g/dL


 


Globulin   (2.6-4.0)  g/dL


 


Albumin/Globulin Ratio   (0.9-1.6)  


 


Amylase   ()  U/L


 


Lipase   ()  U/L


 


Urine Color  YELLOW  


 


Urine Appearance  CLEAR  


 


Urine pH  6.0  (5.0-8.0)  


 


Ur Specific Gravity  1.020  (1.001-1.035)  


 


Urine Protein  NEGATIVE  (NEGATIVE)  mg/dL


 


Urine Glucose (UA)  NEGATIVE  (NEGATIVE)  mg/dL


 


Urine Ketones  NEGATIVE  (NEGATIVE)  mg/dL


 


Urine Occult Blood  NEGATIVE  (NEGATIVE)  


 


Urine Nitrite  NEGATIVE  (NEGATIVE)  


 


Urine Bilirubin  NEGATIVE  (NEGATIVE)  


 


Urine Urobilinogen  0.2  (<2.0)  EU/dL


 


Ur Leukocyte Esterase  NEGATIVE  (NEGATIVE)  











Result Diagrams: 


 11/07/19 06:30





 11/07/19 06:30


Imaging Impressions Last 24 hrs: 





CT abdomen and pelvis:


  Acute appendicitis with fecalith present





Consult PN Assessment/Plan


Procedures: 


Procedures





EMERGENCY DEPT VISIT (05/16/14)


RPR S/N/AX/GEN/TRNK2.6-7.5CM (05/16/14)








(1) Appendicitis


SNOMED Code(s): 14675077


   Code(s): K37 - UNSPECIFIED APPENDICITIS   Current Visit: Yes   


Qualifiers: 


   Appendicitis type: acute appendicitis   Acute appendicitis type: unspecified 

acute appendicitis type   Qualified Code(s): K35.80 - Unspecified acute 

appendicitis   


Problem List Initiated/Reviewed/Updated: Yes


Plan: 





-IV abx started in the ED


-NPO


-Plan for surgery today, laparoscopic possible open appendectomy


-IV fluids,  mL/hr





<Clarence Pimentel - Last Filed: 11/07/19 10:32>





Exam





- Vital Signs


Vital Signs: 


 Last Vital Signs











Temp  97.3 F   11/07/19 08:23


 


Pulse  76   11/07/19 08:56


 


Resp  15   11/07/19 08:56


 


BP  131/61   11/07/19 08:56


 


Pulse Ox  98   11/07/19 08:56














- Patient Data


Lab Results Last 24 hrs: 


 Laboratory Results - last 24 hr











  11/07/19 11/07/19 11/07/19 Range/Units





  06:30 06:30 06:30 


 


WBC  11.66 H    (4.0-11.0)  K/uL


 


RBC  5.19    (4.50-5.90)  M/uL


 


Hgb  15.0    (13.0-17.0)  g/dL


 


Hct  44.4    (38.0-50.0)  %


 


MCV  85.5    (80.0-98.0)  fL


 


MCH  28.9    (27.0-32.0)  pg


 


MCHC  33.8    (31.0-37.0)  g/dL


 


RDW Std Deviation  40.3    (28.0-62.0)  fl


 


RDW Coeff of Caryl  13    (11.0-15.0)  %


 


Plt Count  236    (150-400)  K/uL


 


MPV  9.70    (7.40-12.00)  fL


 


Neut % (Auto)  82.1 H    (48.0-80.0)  %


 


Lymph % (Auto)  11.2 L    (16.0-40.0)  %


 


Mono % (Auto)  5.6    (0.0-15.0)  %


 


Eos % (Auto)  0.8    (0.0-7.0)  %


 


Baso % (Auto)  0.3    (0.0-1.5)  %


 


Neut # (Auto)  9.6 H    (1.4-5.7)  K/uL


 


Lymph # (Auto)  1.3    (0.6-2.4)  K/uL


 


Mono # (Auto)  0.7    (0.0-0.8)  K/uL


 


Eos # (Auto)  0.1    (0.0-0.7)  K/uL


 


Baso # (Auto)  0.0    (0.0-0.1)  K/uL


 


Nucleated RBC %  0.0    /100WBC


 


Nucleated RBCs #  0    K/uL


 


Lactate   1.1   (0.20-2.00)  mmol/L


 


Sodium    140  (136-148)  mmol/L


 


Potassium    4.6  (3.5-5.1)  mmol/L


 


Chloride    105  ()  mmol/L


 


Carbon Dioxide    24.9  (21.0-32.0)  mmol/L


 


BUN    24 H  (7.0-18.0)  mg/dL


 


Creatinine    1.4 H  (0.8-1.3)  mg/dL


 


Est Cr Clr Drug Dosing    85.51  mL/min


 


Estimated GFR (MDRD)    56.7  ml/min


 


Glucose    112 H  ()  mg/dL


 


Calcium    8.7  (8.5-10.1)  mg/dL


 


Total Bilirubin    0.5  (0.2-1.0)  mg/dL


 


AST    28  (15-37)  IU/L


 


ALT    52  (14-63)  IU/L


 


Alkaline Phosphatase    66  ()  U/L


 


Total Protein    7.3  (6.4-8.2)  g/dL


 


Albumin    4.1  (3.4-5.0)  g/dL


 


Globulin    3.2  (2.6-4.0)  g/dL


 


Albumin/Globulin Ratio    1.3  (0.9-1.6)  


 


Amylase    48  ()  U/L


 


Lipase    75  ()  U/L


 


Urine Color     


 


Urine Appearance     


 


Urine pH     (5.0-8.0)  


 


Ur Specific Gravity     (1.001-1.035)  


 


Urine Protein     (NEGATIVE)  mg/dL


 


Urine Glucose (UA)     (NEGATIVE)  mg/dL


 


Urine Ketones     (NEGATIVE)  mg/dL


 


Urine Occult Blood     (NEGATIVE)  


 


Urine Nitrite     (NEGATIVE)  


 


Urine Bilirubin     (NEGATIVE)  


 


Urine Urobilinogen     (<2.0)  EU/dL


 


Ur Leukocyte Esterase     (NEGATIVE)  














  11/07/19 Range/Units





  06:50 


 


WBC   (4.0-11.0)  K/uL


 


RBC   (4.50-5.90)  M/uL


 


Hgb   (13.0-17.0)  g/dL


 


Hct   (38.0-50.0)  %


 


MCV   (80.0-98.0)  fL


 


MCH   (27.0-32.0)  pg


 


MCHC   (31.0-37.0)  g/dL


 


RDW Std Deviation   (28.0-62.0)  fl


 


RDW Coeff of Caryl   (11.0-15.0)  %


 


Plt Count   (150-400)  K/uL


 


MPV   (7.40-12.00)  fL


 


Neut % (Auto)   (48.0-80.0)  %


 


Lymph % (Auto)   (16.0-40.0)  %


 


Mono % (Auto)   (0.0-15.0)  %


 


Eos % (Auto)   (0.0-7.0)  %


 


Baso % (Auto)   (0.0-1.5)  %


 


Neut # (Auto)   (1.4-5.7)  K/uL


 


Lymph # (Auto)   (0.6-2.4)  K/uL


 


Mono # (Auto)   (0.0-0.8)  K/uL


 


Eos # (Auto)   (0.0-0.7)  K/uL


 


Baso # (Auto)   (0.0-0.1)  K/uL


 


Nucleated RBC %   /100WBC


 


Nucleated RBCs #   K/uL


 


Lactate   (0.20-2.00)  mmol/L


 


Sodium   (136-148)  mmol/L


 


Potassium   (3.5-5.1)  mmol/L


 


Chloride   ()  mmol/L


 


Carbon Dioxide   (21.0-32.0)  mmol/L


 


BUN   (7.0-18.0)  mg/dL


 


Creatinine   (0.8-1.3)  mg/dL


 


Est Cr Clr Drug Dosing   mL/min


 


Estimated GFR (MDRD)   ml/min


 


Glucose   ()  mg/dL


 


Calcium   (8.5-10.1)  mg/dL


 


Total Bilirubin   (0.2-1.0)  mg/dL


 


AST   (15-37)  IU/L


 


ALT   (14-63)  IU/L


 


Alkaline Phosphatase   ()  U/L


 


Total Protein   (6.4-8.2)  g/dL


 


Albumin   (3.4-5.0)  g/dL


 


Globulin   (2.6-4.0)  g/dL


 


Albumin/Globulin Ratio   (0.9-1.6)  


 


Amylase   ()  U/L


 


Lipase   ()  U/L


 


Urine Color  YELLOW  


 


Urine Appearance  CLEAR  


 


Urine pH  6.0  (5.0-8.0)  


 


Ur Specific Gravity  1.020  (1.001-1.035)  


 


Urine Protein  NEGATIVE  (NEGATIVE)  mg/dL


 


Urine Glucose (UA)  NEGATIVE  (NEGATIVE)  mg/dL


 


Urine Ketones  NEGATIVE  (NEGATIVE)  mg/dL


 


Urine Occult Blood  NEGATIVE  (NEGATIVE)  


 


Urine Nitrite  NEGATIVE  (NEGATIVE)  


 


Urine Bilirubin  NEGATIVE  (NEGATIVE)  


 


Urine Urobilinogen  0.2  (<2.0)  EU/dL


 


Ur Leukocyte Esterase  NEGATIVE  (NEGATIVE)  











Result Diagrams: 


 11/07/19 06:30





 11/07/19 06:30





Consult PN Assessment/Plan


Procedures: 


Procedures





EMERGENCY DEPT VISIT (05/16/14)


RPR S/N/AX/GEN/TRNK2.6-7.5CM (05/16/14)








(1) Abdominal pain with vomiting


SNOMED Code(s): 43447135


   Code(s): R10.9 - UNSPECIFIED ABDOMINAL PAIN; R11.10 - VOMITING, UNSPECIFIED 

  Priority: Medium   Current Visit: Yes   





(2) Appendicitis


SNOMED Code(s): 50630218


   Code(s): K37 - UNSPECIFIED APPENDICITIS   Priority: High   Current Visit: 

Yes   


Qualifiers: 


   Appendicitis type: acute appendicitis   Acute appendicitis type: unspecified 

acute appendicitis type   Qualified Code(s): K35.80 - Unspecified acute 

appendicitis   


Problem List Initiated/Reviewed/Updated: Yes


My Orders Last 24 Hours: 


My Active Orders





11/07/19 10:10


Influenza Vaccine Charge [RC] .DISCHARGE 





11/07/19 10:27


Antiembolic Devices [RC] PER UNIT ROUTINE 


Insert Urinary Catheter [OM.PC] Timed 


Oxygen Therapy [RC] ASDIRECTED 


RT Incentive Spirometry [RC] Q1HWA 


Skin Preparation [RC] .PREOP 


Urinary Catheter Assessment [RC] ASDIRECTED 


Vital Signs [RC] PER UNIT ROUTINE 


Antiembolic Hose [OM.PC] Routine 


Resuscitation Status Routine 





11/07/19 10:28


Morphine   See Dose Instructions  IVPUSH Q1H PRN 





11/07/19 10:29


Skin Preparation [RC] ASDIRECTED 





11/07/19 10:30


Lactated Ringers @ 125 MLS/HR(1000ml) Lactated Ringers [Ringers, Lactated] 1,

000 ml IV ASDIRECTED 





11/07/19 Breakfast


Nothing Per Oral Diet [DIET] 





11/09/19 10:30


FLU Vacc [id number](6MOS+)/PF [Fluzone Quad [id number] Syringe]   60 mcg IM .ONCE 

ONE 











Plan: 


Patient seen and independently examined.





Laparoscopic appendectomy, possible open appendectomy.  Both operative 

procedures, along with the risks, including, but not limited to, bleeding, 

infection, pneumonia, deep venous thrombosis, pulmonary emboli, myocardial 

infarction, and adjacent organ injury have been reviewed with the patient who 

voices understanding, offers no questions and agrees to proceed.

## 2019-11-08 RX ADMIN — CEFOXITIN SODIUM SCH MLS/HR: 1 INJECTION, SOLUTION INTRAVENOUS at 01:08

## 2019-11-08 NOTE — [id number]
- General Info


Date of Service: 11/08/19


Date of Surgery/Procedure: 11/07/19


POD#: 1


Post-Op Diagnosis: Acute appendicitis


Functional Status: Reports: Pain Controlled (Pain mostly to suprapubic incision 

and with activity), Tolerating Diet, Ambulating, Urinating, Other (Having bowel 

function)





- Review of Systems


General: Reports: No Symptoms


HEENT: Reports: No Symptoms


Pulmonary: Reports: No Symptoms


Cardiovascular: Reports: No Symptoms


Gastrointestinal: Reports: No Symptoms





- Patient Data


Vitals - Most Recent: 


 Last Vital Signs











Temp  98 F   11/08/19 07:45


 


Pulse  74   11/08/19 07:45


 


Resp  17   11/08/19 07:45


 


BP  147/69 H  11/08/19 07:45


 


Pulse Ox  96   11/08/19 07:45











Weight - Most Recent: 285 lb


I&O - Last 24 Hours: 


 Intake & Output











 11/07/19 11/08/19 11/08/19





 22:59 06:59 14:59


 


Intake Total 601 2361 


 


Output Total 100 1500 


 


Balance 501 861 











Med Orders - Current: 


 Current Medications





Acetaminophen (Tylenol)  650 mg PO Q6H [facility]


   Last Admin: 11/08/19 10:19 Dose:  650 mg


Hydromorphone HCl (Dilaudid)  0.5 mg IVPUSH Q1H PRN


   PRN Reason: Other


   Last Admin: 11/08/19 08:14 Dose:  0.5 mg


Lactated Ringer's (Ringers, Lactated)  1,000 mls @ 125 mls/hr IV ASDIRECTED [facility]


   Last Admin: 11/08/19 06:44 Dose:  125 mls/hr


Lactated Ringer's (Ringers, Lactated)  [facility]


Influenza Virus Vaccine (Fluzone Quad [id number] Syringe)  60 mcg IM .ONCE ONE


   Stop: 11/09/19 10:31


Ondansetron HCl (Zofran)  4 mg IVPUSH Q6H PRN


   PRN Reason: Nausea/Vomiting


Oxycodone HCl (Oxycodone)  5 mg PO Q4H PRN


   PRN Reason: Pain (moderate 4-6)


Oxycodone HCl (Oxycodone)  10 mg PO Q4H PRN


   PRN Reason: Pain (severe 7-10)


   Last Admin: 11/08/19 10:21 Dose:  10 mg


Senna/Docusate Sodium (Senna Plus)  1 tab PO BID PRN


   PRN Reason: Constipation


Sodium Chloride (Saline Flush)  10 ml FLUSH ASDIRECTED PRN


   PRN Reason: Keep Vein Open


Sodium Chloride (Saline Flush)  2.5 ml FLUSH ASDIRECTED PRN


   PRN Reason: Keep Vein Open





Discontinued Medications





Acetaminophen (Tylenol)  650 mg PO Q6H [facility]


   Last Admin: 11/07/19 17:23 Dose:  Not Given


Bupivacaine HCl (Marcaine 0.5%) Confirm Administered Dose 30 ml .ROUTE .STK-MED 

ONE


   Stop: 11/07/19 12:10


Cefazolin Sodium (Ancef) Confirm Administered Dose 1 gm .ROUTE .STK-MED ONE


   Stop: 11/07/19 12:11


Fentanyl (Sublimaze) Confirm Administered Dose 250 mcg .ROUTE .STK-MED ONE


   Stop: 11/07/19 12:03


Fentanyl (Sublimaze) Confirm Administered Dose 100 mcg .ROUTE .STK-MED ONE


   Stop: 11/07/19 13:47


Glycopyrrolate (Robinul) Confirm Administered Dose 0.8 mg .ROUTE .STK-MED ONE


   Stop: 11/07/19 12:05


Hydromorphone HCl (Dilaudid)  0.5 mg IVPUSH ONETIME ONE


   Stop: 11/07/19 07:56


   Last Admin: 11/07/19 08:02 Dose:  0.5 mg


Hydromorphone HCl (Dilaudid)  1 mg IVPUSH ONETIME ONE


   Stop: 11/07/19 10:34


   Last Admin: 11/07/19 10:41 Dose:  1 mg


Sodium Chloride (Normal Saline)  1,000 mls @ 999 mls/hr IV STAT ONE


   Stop: 11/07/19 07:20


   Last Admin: 11/07/19 06:22 Dose:  999 mls/hr


Sodium Chloride (Normal Saline) Confirm Administered Dose 20 mls @ as directed 

.ROUTE .STK-MED ONE


   Stop: 11/07/19 06:29


   Last Admin: 11/07/19 06:39 Dose:  20 mls/hr


Sodium Chloride (Normal Saline)  1,000 mls @ 999 mls/hr IV .Bolus ONE


   Stop: 11/07/19 08:02


   Last Admin: 11/07/19 07:46 Dose:  999 mls/hr


Piperacillin Sod/Tazobactam (Sod 3.375 gm/ Sodium Chloride)  50 mls @ 100 mls/

hr IV ONETIME ONE


   Stop: 11/07/19 09:09


   Last Admin: 11/07/19 08:53 Dose:  100 mls/hr


Cefoxitin Sodium 1 gm/ Premix  50 mls @ 100 mls/hr IV Q8H [facility]


   Stop: 11/07/19 22:29


   Last Admin: 11/07/19 17:20 Dose:  Not Given


Cefoxitin Sodium 1 gm/ Premix  50 mls @ 100 mls/hr IV Q8H [facility]


   Stop: 11/08/19 01:14


   Last Admin: 11/08/19 01:08 Dose:  100 mls/hr


Influenza Virus Vaccine (Pharmacy To Dose - Influenza Vaccine)  1 each IM 

ONETIME ONE


   Stop: 11/07/19 10:10


Iopamidol (Isovue-370 (76%))  90 ml IVPUSH ONETIME STA


   Stop: 11/07/19 07:21


   Last Admin: 11/07/19 07:21 Dose:  90 ml


Ketorolac Tromethamine (Toradol) Confirm Administered Dose 30 mg .ROUTE .STK-

MED ONE


   Stop: 11/07/19 12:05


Lidocaine (Xylocaine-Mpf 2%) Confirm Administered Dose 5 ml .ROUTE .STK-MED ONE


   Stop: 11/07/19 12:05


Midazolam HCl (Versed 1 Mg/Ml) Confirm Administered Dose 2 mg .ROUTE .STK-MED 

ONE


   Stop: 11/07/19 12:03


Morphine Sulfate (Morphine)  4 mg IVPUSH ONETIME ONE


   Stop: 11/07/19 06:21


   Last Admin: 11/07/19 06:40 Dose:  4 mg


Morphine Sulfate (Morphine)  0 mg IVPUSH Q1H PRN


   PRN Reason: Pain (moderate 4-6)


   Last Admin: 11/07/19 12:39 Dose:  1 mg


Neostigmine Methylsulfate (Neostigmine) Confirm Administered Dose 5 mg .ROUTE 

.STK-MED ONE


   Stop: 11/07/19 12:05


Ondansetron HCl (Zofran)  4 mg IVPUSH ONETIME ONE


   Stop: 11/07/19 06:21


   Last Admin: 11/07/19 06:36 Dose:  4 mg


Ondansetron HCl (Zofran) Confirm Administered Dose 4 mg .ROUTE .STK-MED ONE


   Stop: 11/07/19 12:05


Pantoprazole Sodium (Protonix Iv***)  80 mg IVPUSH .BOLUS ONE


   Stop: 11/07/19 06:21


   Last Admin: 11/07/19 06:44 Dose:  80 mg


Phenylephrine HCl (Phenylephrine In Ns 100 Mcg/Ml) Confirm Administered Dose 1 

mg .ROUTE .STK-MED ONE


   Stop: 11/07/19 13:42


Propofol (Diprivan  20 Ml) Confirm Administered Dose 200 mg .ROUTE .STK-MED ONE


   Stop: 11/07/19 12:03


Rocuronium Bromide (Zemuron) Confirm Administered Dose 100 mg .ROUTE .STK-MED 

ONE


   Stop: 11/07/19 12:05


Succinylcholine Chloride (Succinylcholine Chloride) Confirm Administered Dose 

200 mg .ROUTE .STK-MED ONE


   Stop: 11/07/19 12:05











- Exam


Wound/Incisions: Dressing Dry and Intact


General: Alert, Oriented


Lungs: Clear to Auscultation, Normal Respiratory Effort


Cardiovascular: Regular Rate, Regular Rhythm


GI/Abdominal Exam: Soft, Non-Tender, No Distention


Extremities: No Pedal Edema


Skin: Warm, Dry, Intact





- Problem List & Annotations


(1) Appendicitis


SNOMED Code(s): 45051390


   Code(s): K37 - UNSPECIFIED APPENDICITIS   Status: Acute   Priority: High   

Current Visit: Yes   


Qualifiers: 


   Appendicitis type: acute appendicitis   Acute appendicitis type: unspecified 

acute appendicitis type   Qualified Code(s): K35.80 - Unspecified acute 

appendicitis   





- Problem List Review


Problem List Initiated/Reviewed/Updated: Yes





- My Orders


Last 24 Hours: 


 Active Orders 24 hr











 Category Date Time Status


 


 Patient Status [ADT] Routine ADT  11/07/19 13:59 Active


 


 Intake and Output [RC] QSHIFT Care  11/07/19 14:00 Active


 


 Oxygen Therapy [RC] PRN Care  11/07/19 13:59 Active


 


 Up ad Renetta [RC] ASDIRECTED Care  11/07/19 13:59 Active


 


 Vital Signs [RC] PER UNIT ROUTINE Care  11/07/19 13:59 Active


 


 Regular Diet [DIET] Diet  11/07/19 Dinner Active


 


 Acetaminophen [Tylenol] Med  11/07/19 16:45 Active





 650 mg PO Q6H   


 


 Docusate Sodium/Sennosides [Senna Plus] Med  11/07/19 13:59 Active





 1 tab PO BID PRN   


 


 FLU Vacc [id number](6MOS+)/PF [Fluzone Quad [id number] Med  11/09/19 10:30 Once





 Syringe]   





 60 mcg IM .ONCE ONE   


 


 HYDROmorphone [Dilaudid] Med  11/07/19 13:59 Active





 0.5 mg IVPUSH Q1H PRN   


 


 Lactated Ringers [Ringers, Lactated] 1,000 ml Med  11/07/19 14:00 Active





 IV ASDIRECTED   


 


 Ondansetron [Zofran] Med  11/07/19 13:59 Active





 4 mg IVPUSH Q6H PRN   


 


 oxyCODONE Med  11/07/19 14:11 Active





 10 mg PO Q4H PRN   


 


 oxyCODONE Med  11/07/19 14:10 Active





 5 mg PO Q4H PRN   








 Medication Orders





Acetaminophen (Tylenol)  650 mg PO Q6H [facility]


   Last Admin: 11/08/19 10:19  Dose: 650 mg


   Admin: 11/08/19 05:10  Dose: 650 mg


   Admin: 11/07/19 23:00  Dose: 650 mg


   Admin: 11/07/19 16:53  Dose: 650 mg


Hydromorphone HCl (Dilaudid)  0.5 mg IVPUSH Q1H PRN


   PRN Reason: Other


   Last Admin: 11/08/19 08:14  Dose: 0.5 mg


Lactated Ringer's (Ringers, Lactated)  1,000 mls @ 125 mls/hr IV ASDIRECTED [facility]


   Last Admin: 11/08/19 06:44  Dose: 125 mls/hr


   Infusion: 11/08/19 05:14  Dose: 125 mls/hr


   Admin: 11/07/19 21:14  Dose: 125 mls/hr


   Infusion: 11/07/19 18:40  Dose: 125 mls/hr


   Admin: 11/07/19 10:40  Dose: 125 mls/hr


Lactated Ringer's (Ringers, Lactated)  1,000 mls @ 125 mls/hr IV ASDIRECTED [facility]


Influenza Virus Vaccine (Fluzone Quad [id number] Syringe)  60 mcg IM .ONCE ONE


   Stop: 11/09/19 10:31


Ondansetron HCl (Zofran)  4 mg IVPUSH Q6H PRN


   PRN Reason: Nausea/Vomiting


Oxycodone HCl (Oxycodone)  5 mg PO Q4H PRN


   PRN Reason: Pain (moderate 4-6)


Oxycodone HCl (Oxycodone)  10 mg PO Q4H PRN


   PRN Reason: Pain (severe 7-10)


   Last Admin: 11/08/19 10:21  Dose: 10 mg


   Admin: 11/08/19 06:43  Dose: 10 mg


   Admin: 11/08/19 02:19  Dose: 10 mg


   Admin: 11/07/19 21:17  Dose: 10 mg


   Admin: 11/07/19 16:55  Dose: 10 mg


Senna/Docusate Sodium (Senna Plus)  1 tab PO BID PRN


   PRN Reason: Constipation


Sodium Chloride (Saline Flush)  10 ml FLUSH ASDIRECTED PRN


   PRN Reason: Keep Vein Open


Sodium Chloride (Saline Flush)  2.5 ml FLUSH ASDIRECTED PRN


   PRN Reason: Keep Vein Open











- Assessment


Assessment           (Free Text/Narrative):: 





MR. Sahni is a 39 yr old male that is POD #1 s/p laparoscopic appendectomy 

for acute appendicitis. Doing well. Pain mostly with moving. Tolerating diet 

and having bowel function.





- Plan


Plan                        (Free Text/Narrative):: 





Discharge home today 


OK to shower tonight

## 2019-11-08 NOTE — [id number]
Post Anesthesia Note





- EVALUATION WITHIN 48HRS OF ANESTHETIC


Vital Signs in Normal Range: Yes


Patient Participated in Evaluation: Yes


Respiratory Function Stable: Yes


Airway Patent: Yes


Cardiovascular Function Stable: Yes


Hydration Status Stable: Yes


Pain Control Satisfactory: Yes


Nausea and Vomiting Control Satisfactory: Yes


Mental Status Recovered: Yes


Vital Signs: 


 Last Vital Signs











Temp  37.5 C   11/08/19 04:15


 


Pulse  75   11/08/19 04:15


 


Resp  16   11/08/19 04:15


 


BP  113/62   11/08/19 04:15


 


Pulse Ox  93 L  11/08/19 04:15